# Patient Record
Sex: MALE | Race: WHITE
[De-identification: names, ages, dates, MRNs, and addresses within clinical notes are randomized per-mention and may not be internally consistent; named-entity substitution may affect disease eponyms.]

---

## 2019-09-24 ENCOUNTER — HOSPITAL ENCOUNTER (OUTPATIENT)
Dept: HOSPITAL 47 - RADECHMAIN | Age: 29
Discharge: HOME | End: 2019-09-24
Attending: FAMILY MEDICINE
Payer: COMMERCIAL

## 2019-09-24 DIAGNOSIS — I27.20: ICD-10-CM

## 2019-09-24 DIAGNOSIS — I08.1: Primary | ICD-10-CM

## 2019-09-24 PROCEDURE — 93306 TTE W/DOPPLER COMPLETE: CPT

## 2019-09-25 NOTE — ECHOF
Referral Reason:[id number] syncope



MEASUREMENTS

--------

HEIGHT: 157.5 cm

WEIGHT: 98.0 kg

BP: 

RVIDd:   4.0 cm     (< 3.3)

IVSd:   1.3 cm     (0.6 - 1.1)

LVIDd:   4.3 cm     (3.9 - 5.3)

LVPWd:   1.2 cm     (0.6 - 1.1)

IVSs:   1.6 cm

LVIDs:   2.8 cm

LVPWs:   1.4 cm

LA Diam:   4.1 cm     (2.7 - 3.8)

LAESV Index (A-L):   30.83 ml/m

Ao Diam:   2.7 cm     (2.0 - 3.7)

AV Cusp:   1.5 cm     (1.5 - 2.6)

LA Diam:   3.6 cm     (2.7 - 3.8)

MV EXCURSION:   21.866 mm     (> 18.000)

MV EF SLOPE:   133 mm/s     (70 - 150)

EPSS:   0.2 cm

MV E Murphy:   0.82 m/s

MV DecT:   203 ms

MV A Murphy:   0.54 m/s

MV E/A Ratio:   1.51 

RAP:   10.00 mmHg

RVSP:   37.48 mmHg

TAPSE:   2.34 cm







FINDINGS

--------

Sinus rhythm.

This was a technically adequate study.

The left ventricular size is normal.   There is mild concentric left ventricular hypertrophy.   Overa
ll left ventricular systolic function is normal with, an EF between 55 - 60 %.   The diastolic fillin
g pattern is normal for the age of the patient 4.65.

The right ventricle is moderate to  severely enlarged.

LA is midly dilated 29-33ml/m2.

The right atrium is mildly enlarged.

The aortic valve is trileaflet, and appears structurally normal. No aortic stenosis or regurgitation.


The mitral valve is normal.   Mild mitral regurgitation is present.

Mild tricuspid regurgitation present.   There is mild pulmonary hypertension.   The right ventricular
 systolic pressure, as measured by Doppler, is 37.48mmHg.

There is no pulmonic regurgitation present.

The aortic root size is normal.

There is no pericardial effusion.



CONCLUSIONS

--------

1. Sinus rhythm.

2. This was a technically adequate study.

3. The left ventricular size is normal.

4. There is mild concentric left ventricular hypertrophy.

5. Overall left ventricular systolic function is normal with, an EF between 55 - 60 %.

6. The diastolic filling pattern is normal for the age of the patient 4.65

7. The right ventricle is moderate to  severely enlarged.

8. LA is midly dilated 29-33ml/m2.

9. The right atrium is mildly enlarged.

10. The aortic valve is trileaflet, and appears structurally normal. No aortic stenosis or regurgitat
ion.

11. Mild mitral regurgitation is present.

12. Mild tricuspid regurgitation present.

13. There is mild pulmonary hypertension.

14. There is no pulmonic regurgitation present.

15. The aortic root size is normal.

16. There is no pericardial effusion.





SONOGRAPHER: Nissa Aleman RDCS

## 2022-12-28 ENCOUNTER — HOSPITAL ENCOUNTER (EMERGENCY)
Dept: HOSPITAL 47 - EC | Age: 32
Discharge: HOME | End: 2022-12-28
Payer: COMMERCIAL

## 2022-12-28 VITALS — DIASTOLIC BLOOD PRESSURE: 65 MMHG | HEART RATE: 85 BPM | SYSTOLIC BLOOD PRESSURE: 107 MMHG

## 2022-12-28 VITALS — RESPIRATION RATE: 18 BRPM | TEMPERATURE: 99 F

## 2022-12-28 DIAGNOSIS — R56.9: ICD-10-CM

## 2022-12-28 DIAGNOSIS — S09.90XA: Primary | ICD-10-CM
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
## 2022-12-28 LAB
ALBUMIN SERPL-MCNC: 4.4 G/DL (ref 3.5–5)
ALP SERPL-CCNC: 53 U/L (ref 38–126)
ALT SERPL-CCNC: 22 U/L (ref 4–49)
ANION GAP SERPL CALC-SCNC: 12 MMOL/L
AST SERPL-CCNC: 33 U/L (ref 17–59)
BASOPHILS # BLD AUTO: 0 K/UL (ref 0–0.2)
BASOPHILS NFR BLD AUTO: 1 %
BUN SERPL-SCNC: 14 MG/DL (ref 9–20)
CALCIUM SPEC-MCNC: 9.3 MG/DL (ref 8.4–10.2)
CHLORIDE SERPL-SCNC: 106 MMOL/L (ref 98–107)
CO2 SERPL-SCNC: 19 MMOL/L (ref 22–30)
EOSINOPHIL # BLD AUTO: 0.1 K/UL (ref 0–0.7)
EOSINOPHIL NFR BLD AUTO: 1 %
ERYTHROCYTE [DISTWIDTH] IN BLOOD BY AUTOMATED COUNT: 4.74 M/UL (ref 4.3–5.9)
ERYTHROCYTE [DISTWIDTH] IN BLOOD: 12.6 % (ref 11.5–15.5)
GLUCOSE SERPL-MCNC: 163 MG/DL (ref 74–99)
HCT VFR BLD AUTO: 43.8 % (ref 39–53)
HGB BLD-MCNC: 15 GM/DL (ref 13–17.5)
LYMPHOCYTES # SPEC AUTO: 1.6 K/UL (ref 1–4.8)
LYMPHOCYTES NFR SPEC AUTO: 24 %
MAGNESIUM SPEC-SCNC: 2 MG/DL (ref 1.6–2.3)
MCH RBC QN AUTO: 31.6 PG (ref 25–35)
MCHC RBC AUTO-ENTMCNC: 34.2 G/DL (ref 31–37)
MCV RBC AUTO: 92.5 FL (ref 80–100)
MONOCYTES # BLD AUTO: 0.3 K/UL (ref 0–1)
MONOCYTES NFR BLD AUTO: 5 %
NEUTROPHILS # BLD AUTO: 4.6 K/UL (ref 1.3–7.7)
NEUTROPHILS NFR BLD AUTO: 68 %
PLATELET # BLD AUTO: 250 K/UL (ref 150–450)
POTASSIUM SERPL-SCNC: 4.3 MMOL/L (ref 3.5–5.1)
PROT SERPL-MCNC: 7.2 G/DL (ref 6.3–8.2)
SODIUM SERPL-SCNC: 137 MMOL/L (ref 137–145)
WBC # BLD AUTO: 6.7 K/UL (ref 3.8–10.6)

## 2022-12-28 PROCEDURE — 72072 X-RAY EXAM THORAC SPINE 3VWS: CPT

## 2022-12-28 PROCEDURE — 80053 COMPREHEN METABOLIC PANEL: CPT

## 2022-12-28 PROCEDURE — 93005 ELECTROCARDIOGRAM TRACING: CPT

## 2022-12-28 PROCEDURE — 36415 COLL VENOUS BLD VENIPUNCTURE: CPT

## 2022-12-28 PROCEDURE — 99285 EMERGENCY DEPT VISIT HI MDM: CPT

## 2022-12-28 PROCEDURE — 83735 ASSAY OF MAGNESIUM: CPT

## 2022-12-28 PROCEDURE — 85025 COMPLETE CBC W/AUTO DIFF WBC: CPT

## 2022-12-28 PROCEDURE — 70450 CT HEAD/BRAIN W/O DYE: CPT

## 2022-12-28 NOTE — XR
EXAMINATION TYPE: XR thoracic spine complete

 

DATE OF EXAM: 12/28/2022 7:43 PM

 

INDICATION: 

Patient age:Male;  32 years old; 

Reason for study: fall injury; 

 

COMPARISON: None

 

TECHNIQUE: 3 views of the thoracic spine in Frontal, swimmer's and lateral projections. 

 

FINDINGS: 

No evidence of acute fracture.  There is no evidence of disk space narrowing or loss of vertebral bod
y height. There is normal alignment of the thoracic vertebral bodies. Atrial septum closure device is
 present.

 

IMPRESSION: 

No acute osseous pathology.

## 2022-12-28 NOTE — CT
EXAMINATION TYPE: CT brain wo con

CT DLP: 1201.4 mGycm, Automated exposure control for dose reduction was used.

 

DATE OF EXAM: 12/28/2022 6:02 PM

 

COMPARISON: None.

 

CLINICAL INDICATION:Male, 32 years old with history of seizure activity, Seizure activity, pt states 
hx of stroke

 

TECHNIQUE: 

Brain: Axial CT images of the brain were obtained with coronal and sagittal reformats created and rev
iewed.

Contrast used: None.

Oral contrast used: None.

 

FINDINGS:

 

Brain:

Extra-axial spaces: No abnormal extra-axial fluid collections.

Ventricular system: Within normal limits

Cerebral parenchyma: Encephalomalacia of the left temporal and parietal region. No acute intraparench
ymal hemorrhage or mass effect.  The gray-white junction is well differentiated. 

Cerebellum: Unremarkable.

Mass effect: No evidence of midline shift.

Intracranial vasculature: unremarkable

Soft tissues: Normal.

Calvarium/osseous structures: No depressed skull fracture.

Paranasal sinuses and mastoid air cells: Mild scattered paranasal sinus disease.

Visualized orbits: Orbital contents are intact.

 

 

IMPRESSION:

1.  No acute intracranial process.

2.  Left parietal/temporal encephalomalacia.

## 2022-12-28 NOTE — ED
Seizure HPI





- General


Chief Complaint: Seizure


Stated Complaint: Slip and fall w/seizure


Time Seen by Provider: 12/28/22 16:50


Source: patient


Mode of arrival: EMS


Limitations: no limitations





- History of Present Illness


Initial Comments: 





This patient is 32-year-old man brought to have evaluation after he had a fall 

and a seizure at home.  The patient states that he had been in his usual state 

of health today.  He was unloading groceries at home with his family.  The 

patient's mother states that she heard the patient fall and when she turned 

around he was having what appeared to be a seizure.  This lasted probably 

between 30 and 60 seconds.  By the time they were able to call EMS the movement 

had stopped.  The patient does remember waking up with EMS personnel there at 

the home.  The patient does state that it feels like his mid back is a little 

sore since the fall.  He also has some mild headache.  No previous history of 

seizure.  He does have history of previous ischemic stroke after he had a septal

defect repair.


MD Complaint: seizure


-: minutes(s)


Description of Episode: loss of consciousness, tonic-clonic movement, post-event

confusion


-: second(s)


Witnessed: yes - by bystander


Trauma: Yes


Seizure History: none


Place: home


Possible Precipitating Event: head injury


Associated Symptoms: denies other symptoms





- Related Data


                                Home Medications











 Medication  Instructions  Recorded  Confirmed


 


Fexofenadine HCl [Allegra Allergy] 180 mg PO DAILY PRN 12/28/22 12/28/22


 


Fluticasone Nasal Spray [Flonase 1 - 2 spr EA NOSTRIL BID PRN 12/28/22 12/28/22





Nasal Spray]   











                                    Allergies











Allergy/AdvReac Type Severity Reaction Status Date / Time


 


No Known Allergies Allergy   Verified 12/28/22 19:12














Review of Systems


ROS Statement: 


Those systems with pertinent positive or pertinent negative responses have been 

documented in the HPI.





ROS Other: All systems not noted in ROS Statement are negative.


Constitutional: Denies: fever, chills


Respiratory: Denies: cough, dyspnea


Cardiovascular: Denies: chest pain, palpitations


Gastrointestinal: Denies: abdominal pain, vomiting


Genitourinary: Denies: dysuria, hematuria, testicular pain


Musculoskeletal: Reports: as per HPI, back pain


Skin: Denies: rash


Neurological: Reports: headache.  Denies: weakness, numbness, paresthesias, 

confusion





Past Medical History


Additional Past Medical History / Comment(s): seizure (12/28/22), CVA s/p heart 

surgery(2020),


History of Any Multi-Drug Resistant Organisms: None Reported


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 

congential heart defect (2020)


Past Psychological History: No Psychological Hx Reported


Smoking Status: Never smoker


Past Alcohol Use History: Occasional


Past Drug Use History: None Reported





General Exam


Limitations: no limitations


General appearance: alert, in no apparent distress


Head exam: Present: atraumatic, normocephalic


Eye exam: Present: normal appearance, PERRL, EOMI.  Absent: scleral icterus, 

conjunctival injection, nystagmus


ENT exam: Present: normal oropharynx


Neck exam: Present: normal inspection, full ROM.  Absent: tenderness, 

meningismus


Respiratory exam: Present: normal lung sounds bilaterally.  Absent: respiratory 

distress, wheezes, rales, rhonchi, stridor, chest wall tenderness


Cardiovascular Exam: Present: regular rate, normal rhythm, normal heart sounds. 

Absent: systolic murmur, diastolic murmur, rubs, gallop


GI/Abdominal exam: Present: soft.  Absent: distended, tenderness, guarding, 

rebound, rigid, mass


Extremities exam: Present: normal inspection, normal capillary refill.  Absent: 

pedal edema, calf tenderness


Back exam: Present: normal inspection, paraspinal tenderness.  Absent: CVA 

tenderness (R), CVA tenderness (L), vertebral tenderness


Neurological exam: Present: alert, oriented X3, CN II-XII intact.  Absent: motor

sensory deficit


Skin exam: Present: warm, dry, intact, normal color.  Absent: rash





Course


                                   Vital Signs











  12/28/22 12/28/22





  16:46 18:51


 


Temperature 99.0 F 


 


Pulse Rate 89 75


 


Respiratory 18 18





Rate  


 


Blood Pressure 115/74 115/80


 


O2 Sat by Pulse 97 98





Oximetry  














Medical Decision Making





- Lab Data


Result diagrams: 


                                 12/28/22 17:37





                                 12/28/22 17:37


                                   Lab Results











  12/28/22 12/28/22 Range/Units





  17:37 17:37 


 


WBC  6.7   (3.8-10.6)  k/uL


 


RBC  4.74   (4.30-5.90)  m/uL


 


Hgb  15.0   (13.0-17.5)  gm/dL


 


Hct  43.8   (39.0-53.0)  %


 


MCV  92.5   (80.0-100.0)  fL


 


MCH  31.6   (25.0-35.0)  pg


 


MCHC  34.2   (31.0-37.0)  g/dL


 


RDW  12.6   (11.5-15.5)  %


 


Plt Count  250   (150-450)  k/uL


 


MPV  8.8   


 


Neutrophils %  68   %


 


Lymphocytes %  24   %


 


Monocytes %  5   %


 


Eosinophils %  1   %


 


Basophils %  1   %


 


Neutrophils #  4.6   (1.3-7.7)  k/uL


 


Lymphocytes #  1.6   (1.0-4.8)  k/uL


 


Monocytes #  0.3   (0-1.0)  k/uL


 


Eosinophils #  0.1   (0-0.7)  k/uL


 


Basophils #  0.0   (0-0.2)  k/uL


 


Sodium   137  (137-145)  mmol/L


 


Potassium   4.3  (3.5-5.1)  mmol/L


 


Chloride   106  ()  mmol/L


 


Carbon Dioxide   19 L  (22-30)  mmol/L


 


Anion Gap   12  mmol/L


 


BUN   14  (9-20)  mg/dL


 


Creatinine   0.77  (0.66-1.25)  mg/dL


 


Est GFR (CKD-EPI)AfAm   >90  (>60 ml/min/1.73 sqM)  


 


Est GFR (CKD-EPI)NonAf   >90  (>60 ml/min/1.73 sqM)  


 


Glucose   163 H  (74-99)  mg/dL


 


Calcium   9.3  (8.4-10.2)  mg/dL


 


Magnesium   2.0  (1.6-2.3)  mg/dL


 


Total Bilirubin   1.1  (0.2-1.3)  mg/dL


 


AST   33  (17-59)  U/L


 


ALT   22  (4-49)  U/L


 


Alkaline Phosphatase   53  ()  U/L


 


Total Protein   7.2  (6.3-8.2)  g/dL


 


Albumin   4.4  (3.5-5.0)  g/dL














- EKG Data


-: EKG Interpreted by Me


EKG shows normal: sinus rhythm (Rate 82 bpm), axis (Normal), intervals (Normal),

QRS complexes (Normal), ST-T waves (Normal)


Rate: normal


Interpretation: normal EKG





Disposition


Clinical Impression: 


 Generalized seizure, Head injury





Disposition: HOME SELF-CARE


Condition: Good


Instructions (If sedation given, give patient instructions):  Seizure/Epilepsy 

Discharge Instructions & Follow-Up, Head Injury (ED)


Is patient prescribed a controlled substance at d/c from ED?: No


Referrals: 


Garrett Amaya MD [Primary Care Provider] - 1-2 days


Oleg Stallworth MD [STAFF PHYSICIAN] - 1-2 days